# Patient Record
Sex: MALE | URBAN - NONMETROPOLITAN AREA
[De-identification: names, ages, dates, MRNs, and addresses within clinical notes are randomized per-mention and may not be internally consistent; named-entity substitution may affect disease eponyms.]

---

## 2024-02-02 ENCOUNTER — CLINICAL SUPPORT (OUTPATIENT)
Dept: GENETICS | Facility: HOSPITAL | Age: 74
End: 2024-02-02
Payer: COMMERCIAL

## 2024-02-02 DIAGNOSIS — Z80.1 FAMILY HISTORY OF LUNG CANCER: ICD-10-CM

## 2024-02-02 DIAGNOSIS — Z13.79 GENETIC TESTING: Primary | ICD-10-CM

## 2024-02-02 DIAGNOSIS — C18.9 MALIGNANT NEOPLASM OF COLON, UNSPECIFIED PART OF COLON: ICD-10-CM

## 2024-02-02 NOTE — PROGRESS NOTES
Eavn Almanzar, a 73-year-old male, was referred for genetic counseling due to a personal history of colon cancer and tumor testing which indicated a risk for Hauser syndrome. Genetic counseling was performed via telehealth. Mr. Alamnzar was in-person at [facility] in [place] at the time of the appointment. Mr. Almanzar was diagnosed with colon cancer in December of 2023 when he presented to the ER with shortness of breath and swelling of the lower extremity. 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 Chemotherapy has been recommended, but he has not started it yet. He reports he has never had a colonoscopy prior to December. 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  Mr. Almanzar was interested in genetic testing to assess his risk for Hauser syndrome. The CancerNext panel was ordered through [employer], which analyzes genes associated with Hauser (EPCAM, MLH1, MSH2, MSH6, PMS2) as well as 31 additional genes associated with a hereditary cancer risk. He had his blood drawn on 2/2/2024 for testing. Results from genetic testing are expected in 2-3 weeks.    FAMILY HISTORY:   Father:   Lung cancer    We do not have medical records regarding the diagnoses in Mr. Almanzar's family.    RISK ASSESSMENT:  Mr. Almanzar's personal history of colon cancer and abnormal IHC result is suspicious for Hauser syndrome.  The majority of colon cancer patients who have absent staining of MSH6 have a germline mutation in MSH6 or MSH2. We discussed Hauser syndrome as a possible diagnosis and the discussed the risks associated with the condition. He meets NCCN guidelines for genetic testing for Hauser syndrome based on the abnormal IHC on tumor testing. We discussed the standard approach to genetic testing is through comprehensive multigene  panel testing that would evaluate a greater number of genes associated with hereditary cancer.  These risk assessments are based on the family history information provided at the time of the appointment and could change in the future should new information be obtained.    GENETIC COUNSELING: We reviewed the family history information in detail.  Cases of cancer follow three general patterns: sporadic, familial, and hereditary.  While most cancer is sporadic, some cases appear to occur in family clusters.  These cases are said to be familial and account for 10-20% of cancer cases.  Familial cases may be due to a combination of shared genes and environmental factors among family members.  In even fewer cases (5-10%), the cancer risk is inherited, and the genes responsible for the increased risk are known.      The pedigree patterns observed in sporadic versus hereditary cancer families were reviewed.  Family histories typical of hereditary cancer syndromes usually include multiple first- and second-degree relatives diagnosed with cancer types that define a syndrome.  These cases tend to be diagnosed at younger-than-expected ages and can be bilateral or multifocal.  The cancer in these families follows an autosomal dominant inheritance pattern, which indicates the likely presence of a mutation in a cancer susceptibility gene.  Children and siblings of an individual believed to carry this mutation have a 50% chance of inheriting that mutation, thereby inheriting the increased risk to develop cancer.    Hereditary colon cancer accounts for 5-10% of all cases of colon cancer. The most common hereditary condition associated with an increased risk for uterine cancer is Hauser syndrome.  The lifetime risk for colon cancer for individuals with Hauser syndrome is up to 80% if there is no intervention (i.e. removal of polyps detected on colonoscopy).  Routine screening colonoscopy has been shown to reduce the incidence and  mortality of colon cancer in Hauser syndrome families by as much as 65%.  Other risks include cancer of the endometrium/uterus, ovary, stomach, urinary tract, small intestines, biliary tract, and brain.  MLH1 and MSH2 carry these higher risks, while MSH6 and PMS2 carry lower risks.  NCCN screening recommendations vary by degree of risk.  There are other genes in addition to the Hauser syndrome genes that include the risk for colon cancer and other cancers.      We discussed that there are other hereditary cancer syndromes. Some of these conditions have well defined cancer risks and established management guidelines.  Other genes that can be tested for have been more recently described, and there may be less data regarding the risks and therefore may not have established management guidelines.  We discussed these limitations at length. Mr. Almanzar elected to pursue genetic testing to learn more information about potential risks to himself and relatives.      GENETIC TESTING:  The risks, benefits and limitations of genetic testing and implications for clinical management following testing were reviewed. DNA test results can influence decisions regarding screening and prevention.  Genetic testing can have significant psychological implications for both individuals and families. Also discussed was the possibility of employment and insurance discrimination based on genetic test results and the federal and states laws that are in place to prevent this (LETICIA).         We discussed panel testing, which would involve testing 36 genes associated with increased cancer risk. The benefits and limitations of genetic testing were discussed. The implications of a positive or negative test result were discussed. We discussed the possibility that, in some cases, genetic test results may be ambiguous due to the identification of a genetic variant of uncertain significance (VUS). These variants may or may not be associated with an increased  cancer risk. With multigene panel testing, it is not uncommon for a VUS to be identified.  If a VUS is identified, testing family members is not recommended and screening recommendations are made based on the family history. The laboratories that perform genetic testing work to reclassify the VUS and send out an amended report if and when a VUS is reclassified.  The majority of variant findings are ultimately reclassified to a negative result.     PLAN: Genetic testing was ordered via the CancerNext panel through "[employer]". His blood was drawn today, 2/2, for testing. Results are expected in 2-3 weeks once [employer] receives the sample.?If he has any questions in the meantime, he is welcome to call me at [phone].     Blanka Batista MS, [facility] [place], [place], [facility]  Licensed Certified Genetic Counselor

## 2024-02-26 ENCOUNTER — TELEPHONE (OUTPATIENT)
Dept: GENETICS | Facility: HOSPITAL | Age: 74
End: 2024-02-26
Payer: COMMERCIAL

## 2024-02-26 NOTE — TELEPHONE ENCOUNTER
Attempted to call pt regarding genetic testing results. Pt's wife answered and stated he was at work. She will have pt call me back when he returns.

## 2024-02-27 NOTE — TELEPHONE ENCOUNTER
Spoke with patient and disclosed negative genetic results. Informed patient these results would be mailed to him and sent to his

## 2024-02-28 ENCOUNTER — DOCUMENTATION (OUTPATIENT)
Dept: GENETICS | Facility: HOSPITAL | Age: 74
End: 2024-02-28
Payer: COMMERCIAL

## 2024-02-28 NOTE — PROGRESS NOTES
Evan Almanzar, a 73-year-old male, was referred for genetic counseling due to a personal history of colon cancer and tumor testing which indicated a risk for Hauser syndrome. Genetic counseling was performed via telehealth. Mr. Almanzar was in-person at [facility] in [place] at the time of the appointment. Mr. Almanzar was diagnosed with colon cancer in December of 2023 when he presented to the ER with shortness of breath and swelling of the lower extremity. 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 Chemotherapy has been recommended, but he has not started it yet. He reports he has never had a colonoscopy prior to December. 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  Mr. Almanzar was interested in genetic testing to assess his risk for Hauser syndrome. The CancerNext panel was ordered through [employer], which analyzes genes associated with Hauser (EPCAM, MLH1, MSH2, MSH6, PMS2) as well as 31 additional genes associated with a hereditary cancer risk. Genetic testing was negative for pathogenic mutations in the 36 genes included on the CancerNext panel. These normal results were discussed with Mr. Almanzar by telephone on 2/27/24.     FAMILY HISTORY:   Father:                         Lung cancer     We do not have medical records regarding the diagnoses in Mr. Almanzar's family.     RISK ASSESSMENT:  Mr. Almanzar's personal history of colon cancer and abnormal IHC result is suspicious for Hauser syndrome.  The majority of colon cancer patients who have absent staining of MSH6 have a germline mutation in MSH6 or MSH2. We discussed Hauser syndrome as a possible diagnosis and the discussed the risks associated with the condition. He meets NCCN guidelines for genetic testing for Hauser syndrome based on the abnormal IHC on tumor  testing. We discussed the standard approach to genetic testing is through comprehensive multigene panel testing that would evaluate a greater number of genes associated with hereditary cancer.  These risk assessments are based on the family history information provided at the time of the appointment and could change in the future should new information be obtained.     GENETIC COUNSELING: We reviewed the family history information in detail.  Cases of cancer follow three general patterns: sporadic, familial, and hereditary.  While most cancer is sporadic, some cases appear to occur in family clusters.  These cases are said to be familial and account for 10-20% of cancer cases.  Familial cases may be due to a combination of shared genes and environmental factors among family members.  In even fewer cases (5-10%), the cancer risk is inherited, and the genes responsible for the increased risk are known.       The pedigree patterns observed in sporadic versus hereditary cancer families were reviewed.  Family histories typical of hereditary cancer syndromes usually include multiple first- and second-degree relatives diagnosed with cancer types that define a syndrome.  These cases tend to be diagnosed at younger-than-expected ages and can be bilateral or multifocal.  The cancer in these families follows an autosomal dominant inheritance pattern, which indicates the likely presence of a mutation in a cancer susceptibility gene.  Children and siblings of an individual believed to carry this mutation have a 50% chance of inheriting that mutation, thereby inheriting the increased risk to develop cancer.     Hereditary colon cancer accounts for 5-10% of all cases of colon cancer. The most common hereditary condition associated with an increased risk for uterine cancer is Hauser syndrome.  The lifetime risk for colon cancer for individuals with Hauser syndrome is up to 80% if there is no intervention (i.e. removal of polyps  detected on colonoscopy).  Routine screening colonoscopy has been shown to reduce the incidence and mortality of colon cancer in Hauser syndrome families by as much as 65%.  Other risks include cancer of the endometrium/uterus, ovary, stomach, urinary tract, small intestines, biliary tract, and brain.  MLH1 and MSH2 carry these higher risks, while MSH6 and PMS2 carry lower risks.  NCCN screening recommendations vary by degree of risk.  There are other genes in addition to the Hauser syndrome genes that include the risk for colon cancer and other cancers.       We discussed that there are other hereditary cancer syndromes. Some of these conditions have well defined cancer risks and established management guidelines.  Other genes that can be tested for have been more recently described, and there may be less data regarding the risks and therefore may not have established management guidelines.  We discussed these limitations at length. Mr. Almanzar elected to pursue genetic testing to learn more information about potential risks to himself and relatives.        GENETIC TESTING:  The risks, benefits and limitations of genetic testing and implications for clinical management following testing were reviewed. DNA test results can influence decisions regarding screening and prevention.  Genetic testing can have significant psychological implications for both individuals and families. Also discussed was the possibility of employment and insurance discrimination based on genetic test results and the federal and states laws that are in place to prevent this (LETICIA).         We discussed panel testing, which would involve testing 36 genes associated with increased cancer risk. The benefits and limitations of genetic testing were discussed. The implications of a positive or negative test result were discussed. We discussed the possibility that, in some cases, genetic test results may be ambiguous due to the identification of a genetic  variant of uncertain significance (VUS). These variants may or may not be associated with an increased cancer risk. With multigene panel testing, it is not uncommon for a VUS to be identified.  If a VUS is identified, testing family members is not recommended and screening recommendations are made based on the family history. The laboratories that perform genetic testing work to reclassify the VUS and send out an amended report if and when a VUS is reclassified.  The majority of variant findings are ultimately reclassified to a negative result.      TEST RESULTS:  Genetic testing was negative for known pathogenic mutations by sequencing, rearrangement testing, and RNA analysis for the 36 genes included on this panel.  This negative result greatly lowers the risk of a hereditary cancer syndrome for Mr. Almanzar. Tumor testing has not been performed to determine if the abnormal IHC could be due to a somatic mutation. The germline testing that was negative for known pathogenic mutations greatly lowers the likelihood of a hereditary cancer syndrome for Mr. Almanzar; however, the possibility of an unidentifiable mutation cannot be ruled out. Recommendations for close relatives can be made based on the family history.    CANCER SCREENING: Mr. Almanzar's management and surveillance should be determined by his oncology team. For his family members, current NCCN guidelines recommend that individuals who have a first-degree relative diagnosed with colon cancer at any age should begin colonoscopy screening at age 40 or ten years before the earliest diagnosis of colorectal cancer in the family, whichever is earliest, and repeat every five years or more frequently based on personal clinical findings.  This assessment is based on the information provided at the time of the consultation and could change should new information become available regarding the family history.    PLAN: Genetic counseling remains available to Mr. Almanzar. He is  welcome to contact our office at [phone] with any questions he may have.      Blanka Batista, MS, [facility] [place], [facility]  Licensed Certified Genetic Counselor     Cc: Umesh Galicia MD